# Patient Record
(demographics unavailable — no encounter records)

---

## 2025-06-11 NOTE — PROCEDURE
[FreeTextEntry1] : We discussed treatment options and shared decision-making was undertaken. I recommended that the patient undergo a diagnostic and therapeutic carpal tunnel injection. The risks benefits and alternatives were discussed with the patient including, but not limited to, subcutaneous atrophy, skin depigmentation, nerve injury, infection, worsening symptoms, pain etc. The patient agreed and understood informed consent and sterile conditions the right carpal tunnel was injected with 1 cc of Kenalog 10. A sterile Band-Aid was placed.   My impression is that the patient has a stable right TFCC tear. We discussed treatment options and she elected to undergo a TFCC injection. The risks, benefits, and alternatives were discussed with the patient. This included, but was not limited to nerve injury, infection, subcutaneous atrophy, skin depigmentation etc. Under informed consent and sterile conditions the ulnocarpal joint & TFCC was injected with a combination of 1/2 cc Kenalog-10 and 1/2 cc of 2% plain lidocaine. It is my hopes that this significantly alleviates the patients symptoms.

## 2025-06-11 NOTE — PHYSICAL EXAM
[de-identified] : Physical exam demonstrates the patient to be alert and oriented x 3 and capable of ambulation. The patient is well-developed and well-nourished in no apparent respiratory distress. The majority of the skin is intact bilaterally in the upper extremities without any bilateral elbow lymphadenopathy.  Evaluation of both elbows reveals full symmetric range of motion from full extension to 140 of flexion with full pronation and full supination.  The wrists have near symmetric range of motion bilaterally; mildly diminished terminal right wrist active flexion.  Tender over the right radiocarpal joint and proximal pole scaphoid. There is a negative Suarez's test bilaterally. There is no tenderness over the distal radial ulnar joint but there is tenderness over the right TFCC, no DRUJ instability.  No ECU subluxation. There is no tenderness over the pisotriquetral joint, hamate hook, or CMC joints bilaterally. The patient is nontender over both scaphoids and anatomic snuffbox is bilaterally. There is no clubbing cyanosis or edema.  Full, symmetric digital ROM.   There is good capillary refill of the digits bilaterally. There are no masses palpated or sensitivity over the median and ulnar nerves at the level of the wrist.  Positive Tinel's over the right carpal tunnel, positive Phalen's test.  There is 5/5 strength right APB. Sensation is intact to light touch bilaterally.  [de-identified] : PA, oblique and lateral x-rays of both wrist were obtained today to assess for bony injury.  There is a healed right distal radius fracture in overall acceptable alignment.  Possible fracture line at the proximal pole of the scaphoid
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
## 2025-06-11 NOTE — ASSESSMENT
[FreeTextEntry1] : My impression is that the patient has a stable right TFCC tear that she sustained at the time of her right wrist injury versus possibly flared up with rehabilitation. Initial treatment options were discussed. I recommended a dedicated trial of wrist rest and support with a wrist widget for 4-6 weeks. I recommended the patient also modify their activities, not lift or carry anything heavier than 5-10 pounds nor strain/load the affected upper extremity. I encouraged anti-inflammatory medications to help with symptom control as well.  Shared decision making was made to also proceed with a right wrist TFCC steroid injection today.  Regarding her healing right distal radius fracture, I explained that the overall alignment of the wrist is acceptable.  I did mention the possibility of a proximal pole scaphoid fracture and therefore recommended she obtain a STAT CT study of the right wrist to more thoroughly assess things.  My impression is that the patient has right carpal tunnel syndrome. I discussed the nature of the condition as well as both nonoperative and operative treatment options. I explained that nonoperative management entails carpal tunnel night splint wear and/or corticosteroid injection into the carpal tunnel. I explained that both options do not definitively address the peripheral nerve compression and are only aimed to help alleviate symptoms.  I did mention that the condition can progress with time and lead to sensory loss and/or irreversible muscle weakness.  I discussed surgery, which entails decompression of the carpal tunnel by incising the transverse carpal ligament.  I explained that this minimizes/prevent the progression of continued nerve compression and worsening sensory loss/weakness.  I did note that it involves potential risks and complications.  Shared decision making was made today and the patient elected to proceed with a right carpal tunnel injection for symptom relief.  She will plan to follow back up with me on an as-needed basis.  Greater than 60 minutes was spent for the encounter in total

## 2025-06-20 NOTE — REASON FOR VISIT
[Home] : at home, [unfilled] , at the time of the visit. [Medical Office: ([facility])___] : at the medical office located in  [Telephone (audio)] : This telephonic visit was provided via audio only technology. [Verbal consent obtained from patient] : the patient, [unfilled]

## 2025-06-20 NOTE — HISTORY OF PRESENT ILLNESS
[FreeTextEntry1] : I contacted the patient today to discuss her recently obtained right wrist CT from 6.20.25.  The patient reports that overall, her right wrist symptoms are a bit improved after the TFCC injection and she does not report any numbness and tingling since the carpal tunnel steroid injection last visit..

## 2025-06-20 NOTE — ASSESSMENT
[FreeTextEntry1] : I had a lengthy discussion with the patient today regarding her right wrist CT findings demonstratin.  Healed comminuted intra-articular fracture of the distal radius with mild articular contour deformity. 2.  No acute fracture.  I reassured her that there were no signs of a proximal pole scaphoid fracture and I explained that some of the residual wrist discomfort and occasional pain may be secondary to posttraumatic arthrosis.  I encouraged the progress of activities, as she feels comfortable and reassured her that the TFCC sprain/partial tear and carpal tunnel syndrome under better control following the steroid injections.  I directed she follow back up with me if she notices worsening symptoms in the future.  All questions were answered.  The patient did not provide consent for video telecommunication today. 32 minutes was spent for the encounter in total